# Patient Record
Sex: MALE | Race: OTHER | URBAN - METROPOLITAN AREA
[De-identification: names, ages, dates, MRNs, and addresses within clinical notes are randomized per-mention and may not be internally consistent; named-entity substitution may affect disease eponyms.]

---

## 2023-01-27 ENCOUNTER — EMERGENCY (EMERGENCY)
Facility: HOSPITAL | Age: 38
LOS: 1 days | Discharge: HOME | End: 2023-01-27
Attending: EMERGENCY MEDICINE | Admitting: EMERGENCY MEDICINE
Payer: SELF-PAY

## 2023-01-27 VITALS
HEART RATE: 95 BPM | RESPIRATION RATE: 18 BRPM | OXYGEN SATURATION: 96 % | DIASTOLIC BLOOD PRESSURE: 97 MMHG | WEIGHT: 197.98 LBS | HEIGHT: 67 IN | TEMPERATURE: 98 F | SYSTOLIC BLOOD PRESSURE: 149 MMHG

## 2023-01-27 PROCEDURE — 99284 EMERGENCY DEPT VISIT MOD MDM: CPT | Mod: 25

## 2023-01-27 PROCEDURE — 12011 RPR F/E/E/N/L/M 2.5 CM/<: CPT

## 2023-01-27 RX ORDER — LIDOCAINE HYDROCHLORIDE AND EPINEPHRINE 10; 10 MG/ML; UG/ML
20 INJECTION, SOLUTION INFILTRATION; PERINEURAL ONCE
Refills: 0 | Status: COMPLETED | OUTPATIENT
Start: 2023-01-27 | End: 2023-01-27

## 2023-01-27 RX ORDER — ACETAMINOPHEN 500 MG
650 TABLET ORAL ONCE
Refills: 0 | Status: COMPLETED | OUTPATIENT
Start: 2023-01-27 | End: 2023-01-27

## 2023-01-27 RX ADMIN — Medication 650 MILLIGRAM(S): at 14:46

## 2023-01-27 RX ADMIN — LIDOCAINE HYDROCHLORIDE AND EPINEPHRINE 20 MILLILITER(S): 10; 10 INJECTION, SOLUTION INFILTRATION; PERINEURAL at 13:19

## 2023-01-27 NOTE — ED PROVIDER NOTE - PROVIDER TOKENS
PROVIDER:[TOKEN:[[id number]],FOLLOWUP:[4-6 Days]],PROVIDER:[TOKEN:[[id number]],FOLLOWUP:[4-6 Days]]

## 2023-01-27 NOTE — ED ADULT NURSE NOTE - OBJECTIVE STATEMENT
pt had disposable sutures, states at work today "it burst" walked in with pressure dressing applied, but bleeding through, trousers covered in blood, when gauze pulled away pt had arterial bleed, pt upgraded and dr requested at bedside, more pressure dressings applied
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
## 2023-01-27 NOTE — ED PROVIDER NOTE - CARE PLAN
Principal Discharge DX:	Lip laceration  Assessment and plan of treatment:	laceration repair. F/up plastics vs oral surgery   1

## 2023-01-27 NOTE — ED ADULT NURSE REASSESSMENT NOTE - NS ED NURSE REASSESS COMMENT FT1
written in retrospect, pt ugraded, asked dr martinez to review pt as arterial bleed to lip sitting in chair, moved to room, pressure applied with gauze++,  now at bedside, Andrés arriaga being held by pt, not on blood thinners

## 2023-01-27 NOTE — ED PROVIDER NOTE - CARE PROVIDER_API CALL
Priscila Harper)  Plastic Surgery; Surgery  [address], [address]  [place] [address]  Phone: [phone]  Fax: [phone]  Follow Up Time: 4-6 Days    Hari Marie (DDS; MD)  OralMaxillofacial Surgery  [address] [place] [address]  Phone: [phone]  Fax: [phone]  Follow Up Time: 4-6 Days
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
## 2023-01-27 NOTE — ED ADULT NURSE REASSESSMENT NOTE - NS ED NURSE REASSESS COMMENT FT1
Pt care back from break coverage by Maximo Khan, pt not at bedside, bed area cleaned appears to have left the er, Dr Cortes

## 2023-01-27 NOTE — ED PROVIDER NOTE - NSFOLLOWUPINSTRUCTIONS_ED_ALL_ED_FT
Please read all handouts provided to you upon discharge. Seek immediate medical care for any new/worsening signs or symptoms. Please follow up with physicians provided to you in the ED.      Laceración dental    LO QUE NECESITA SABER:    Papito laceración dental es un rikki, tajo o desgarro en el tejido blando alrededor de los dientes. [place] puede incluir la lengua, las encías, los labios o el interior de las mejillas. Normalmente la causa de papito laceración dental es un traumatismo. Por ejemplo, un accidente automovilístico, papito caída o papito lesión deportiva.   Anatomía de la boca         INSTRUCCIONES SOBRE EL [facility] HOSPITALARIA:    Regrese a la madisyn de emergencias si:  •Está sangrando más de lo previsto, incluso cuando aplica presión.      •Usted tiene adormecimiento repentino en quintanilla bhaskar.      •Usted tiene dolor intenso.      •Usted no puede  parte de quintanilla luanne.      Llame a quintanilla médico o dentista si:  •Usted tiene fiebre o escalofríos.      •Tiene hinchazón, enrojecimiento o sangrado.      •El área de la cirugía supura papito secreción amarillenta o verdosa.      •Siente dolor que no desaparece o que no se calma con analgésicos.      •Usted tiene dificultad para abrir la boca o masticar.      •Usted tiene preguntas o inquietudes acerca de quintanilla condición o cuidado.      Medicamentos:Es posible que usted necesite alguno de los siguientes:   •Los antibióticosayudan a tratar o evitar papito infección a causa de papito bacteria.      •Acetaminofénalivia el dolor y baja la fiebre. Está disponible sin receta médica. Pregunte la cantidad y la frecuencia con que debe tomarlos. Siga las indicaciones. Elizabeth las etiquetas de todos los demás medicamentos que esté usando para saber si también contienen acetaminofén, o pregunte a quintanilla médico o farmacéutico. El acetaminofén puede causar daño en el hígado cuando no se jose de forma correcta.      •AINEcomo el ibuprofeno, ayudan a disminuir la inflamación, el dolor y la fiebre. [place] medicamento está disponible con o sin papito receta médica. Los NOLAN pueden causar sangrado estomacal o problemas renales en ciertas personas. Si usted está tomando un anticoagulante, siempre pregunte si los NOLAN son seguros para usted. Siempre elizabeth la etiqueta de sheridan medicamento y siga las instrucciones. No administre sheridan medicamento a niños menores de 6 meses de chivo sin antes obtener la autorización del médico.      •Puede administrarsepodrían administrarse. Pregunte al médico cómo debe isabel sheridan medicamento de forma gil. Algunos medicamentos recetados para el dolor contienen acetaminofén. No tome otros medicamentos que contengan acetaminofén sin consultarlo con quintanilla médico. Demasiado acetaminofeno puede causar daño al hígado. Los medicamentos recetados para el dolor podrían causar estreñimiento. Pregunte a quintanilla médico lee prevenir o tratar estreñimiento.      •[place] rachel medicamentos lee se le haya indicado.Consulte con quintanilla médico si usted yolande que quintanilla medicamento no le está ayudando o si presenta efectos secundarios. Infórmele al médico si usted es alérgico a algún medicamento. Mantenga papito lista actualizada de los medicamentos, las vitaminas y los productos herbales que jose. Incluya los siguientes datos de los medicamentos: cantidad, frecuencia y motivo de administración. Traiga con usted la lista o los envases de las píldoras a rachel citas de seguimiento. Lleve la lista de los medicamentos con usted en gretta de papito emergencia.      Cuidados personales:  •Cuídese la boca hasta que sane.Use un cepillo de dientes suave. Hágase enjuagues bucales lee se lo hayan indicado. Quintanilla médico puede recomendar papito solución que contenga clorhexidina al 0.1%. Esta solución ayuda a evitar las infecciones causadas por bacterias. Hágase enjuagues 2 veces al día konstantin 1 semana, o lee se lo hayan indicado.      •Coma alimentos blandos o ember líquidos konstantin 1 semana, o lee se lo hayan indicado.Los alimentos blandos y los líquidos podrían ser más fáciles para consumir hasta que sane quintanilla herida. Los alimentos blandos incluyen el puré de manzana, el pudín, el puré de papa, la gelatina y el helado.      •Aplique hieloen la mandíbula o las mejillas de 15 a 20 minutos cada hora o lee se le indique. Use papito compresa de hielo o ponga hielo triturado en papito bolsa de plástico. Cúbralo con papito toalla antes de aplicarlo. El hielo ayuda a evitar daño al tejido y a disminuir la inflamación y el dolor.      •Mantenga limpias las heridas de los tejidos blandos.Use el enjuague bucal recetado lee se lo hayan indicado. También puede hacer gárgaras con papito solución de agua salada. Para preparar la solución, mezcle 1 cucharadita de sal en 1 taza de agua tibia. Pida más información a quintanilla médico sobre cómo limpiar rachel heridas.      Acuda a la consulta de control con quintanilla odontólogo o el cirujano oral según le indicaron:Es posible que usted necesite regresar para que le quiten los puntos de sutura. Es posible que lo remitan a un especialista para que le realice más exámenes o tratamientos. Anote rachel preguntas para que se acuerde de hacerlas konstantin rachel visitas.

## 2023-01-27 NOTE — ED PROVIDER NOTE - PATIENT PORTAL LINK FT
You can access the FollowMyHealth Patient Portal offered by [facility] by registering at the following website: [email or web address]. By joining [facility]’s FollowMyHealth portal, you will also be able to view your health information using other applications (apps) compatible with our system.
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
## 2023-01-31 DIAGNOSIS — S01.511A LACERATION WITHOUT FOREIGN BODY OF LIP, INITIAL ENCOUNTER: ICD-10-CM

## 2023-01-31 DIAGNOSIS — R42 DIZZINESS AND GIDDINESS: ICD-10-CM
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
